# Patient Record
Sex: FEMALE | Race: WHITE | NOT HISPANIC OR LATINO | URBAN - METROPOLITAN AREA
[De-identification: names, ages, dates, MRNs, and addresses within clinical notes are randomized per-mention and may not be internally consistent; named-entity substitution may affect disease eponyms.]

---
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
## 2022-05-11 NOTE — BRIEF OPERATIVE NOTE - NSICDXBRIEFPROCEDURE_GEN_ALL_CORE_FT
PROCEDURES:  Laparoscopic left ovarian cystectomy 13-Mar-2020 15:23:02  Nancy Reynaga
Initial (On Arrival)

## 2022-05-24 NOTE — ASU PREOP CHECKLIST - ORDERS/MEDICATION ADMINISTRATION RECORD ON CHART
[facility]  [address] [place] [id number]  [phone]    Work/School Note    Date: 5/24/2022    To Whom It May concern:    Joanne Cisneros was seen and treated today in the emergency room by the following provider(s):  Attending Provider: Ana Early MD  Nurse Practitioner: Darshana Marvin NP. Joanne Cisneros is excused from work/school on 5/24/2022 through 5/26/2022. She is medically clear to return to work/school on 5/27/2022.          Sincerely,          Darwin De Jesus NP
done

## 2023-04-25 PROBLEM — Z00.00 ENCOUNTER FOR PREVENTIVE HEALTH EXAMINATION: Status: ACTIVE | Noted: 2023-04-25

## 2023-12-15 NOTE — ASU PREOP CHECKLIST - TAMPON REMOVED
n/a

## 2024-02-01 NOTE — ASU PATIENT PROFILE, PEDIATRIC - NS CRAFFT PART A VALIDATION ALCOHOL
PAST MEDICAL HISTORY:  No pertinent past medical history      Patient answered NO to all of the above 3 questions.